# Patient Record
Sex: MALE
[De-identification: names, ages, dates, MRNs, and addresses within clinical notes are randomized per-mention and may not be internally consistent; named-entity substitution may affect disease eponyms.]

---

## 2024-05-06 ENCOUNTER — APPOINTMENT (OUTPATIENT)
Dept: UROLOGY | Facility: CLINIC | Age: 59
End: 2024-05-06

## 2024-06-05 PROBLEM — Z00.00 ENCOUNTER FOR PREVENTIVE HEALTH EXAMINATION: Status: ACTIVE | Noted: 2024-06-05

## 2024-07-15 ENCOUNTER — APPOINTMENT (OUTPATIENT)
Dept: UROLOGY | Facility: CLINIC | Age: 59
End: 2024-07-15
Payer: COMMERCIAL

## 2024-07-15 VITALS
DIASTOLIC BLOOD PRESSURE: 78 MMHG | HEART RATE: 94 BPM | SYSTOLIC BLOOD PRESSURE: 126 MMHG | TEMPERATURE: 98 F | OXYGEN SATURATION: 94 % | HEIGHT: 72 IN | BODY MASS INDEX: 26.41 KG/M2 | WEIGHT: 195 LBS

## 2024-07-15 DIAGNOSIS — R35.0 FREQUENCY OF MICTURITION: ICD-10-CM

## 2024-07-15 DIAGNOSIS — Z82.49 FAMILY HISTORY OF ISCHEMIC HEART DISEASE AND OTHER DISEASES OF THE CIRCULATORY SYSTEM: ICD-10-CM

## 2024-07-15 DIAGNOSIS — B20 HUMAN IMMUNODEFICIENCY VIRUS [HIV] DISEASE: ICD-10-CM

## 2024-07-15 DIAGNOSIS — Z86.79 PERSONAL HISTORY OF OTHER DISEASES OF THE CIRCULATORY SYSTEM: ICD-10-CM

## 2024-07-15 DIAGNOSIS — Z78.9 OTHER SPECIFIED HEALTH STATUS: ICD-10-CM

## 2024-07-15 DIAGNOSIS — R39.12 POOR URINARY STREAM: ICD-10-CM

## 2024-07-15 PROCEDURE — 99205 OFFICE O/P NEW HI 60 MIN: CPT

## 2024-07-15 PROCEDURE — 76857 US EXAM PELVIC LIMITED: CPT

## 2024-07-16 LAB
PSA FREE FLD-MCNC: 13 %
PSA FREE SERPL-MCNC: 0.13 NG/ML
PSA SERPL-MCNC: 0.97 NG/ML

## 2024-07-17 PROBLEM — Z82.49 FAMILY HISTORY OF HYPERTENSION: Status: ACTIVE | Noted: 2024-07-17

## 2024-07-17 PROBLEM — B20 HIV DISEASE: Status: RESOLVED | Noted: 2024-07-17 | Resolved: 2024-07-17

## 2024-07-17 PROBLEM — Z78.9 SOCIAL ALCOHOL USE: Status: ACTIVE | Noted: 2024-07-17

## 2024-07-17 PROBLEM — Z78.9 NON-SMOKER: Status: ACTIVE | Noted: 2024-07-17

## 2024-07-17 PROBLEM — Z86.79 HISTORY OF HYPERTENSION: Status: RESOLVED | Noted: 2024-07-17 | Resolved: 2024-07-17

## 2024-08-15 ENCOUNTER — APPOINTMENT (OUTPATIENT)
Dept: UROLOGY | Facility: CLINIC | Age: 59
End: 2024-08-15
Payer: COMMERCIAL

## 2024-08-15 PROCEDURE — 99213 OFFICE O/P EST LOW 20 MIN: CPT

## 2024-08-15 NOTE — HISTORY OF PRESENT ILLNESS
[FreeTextEntry1] :  Language: English Date of First visit: 2024 Accompanied by: *** Contact info: *** Referring Provider/PCP: Guy Mtz  [phone]      CC/ Problem List:   =============================================================================== FIRST VISIT: The patient has been a private practice patient of Dr. Wilmar Marcano Prior paper chart WAS available for this visit. Any Prior paper chart is scanned into the [employer] system. Please see admin   The patient's first visit with [facility] urology was on 2024 when the patient was 59 years old. He was initially seen in  by Dr Wilmar Marcano for urinary retention and ED.  This happened randomly. A Michael was inserted, and he was started on Flomax BID and Ciprofloxacin. He had another episode of retention in  and was subsequently started in Finasteride. He had this after a surgery. He was given antifungal cream in 2017 for balanitis which helped resolve his "rash". 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 His most recent PSA on 3/17/23 was 1.94 (corrected for 5ARI).     ------------------------------------------------------------------------------------------- INTERVAL VISITS:    He was first seen in 2024.  He was no longer on Zoloft but was on Sildenafil, Tamsulosin and finasteride. He did still report some AM hesitancy and sometimes has weak stream. He sometimes does not feel like his bladder empties fully. He denies straining to void.  He does occasionally have slight dysuria, no hematuria. He denies constipation. He has a hard time ejaculating.  The patient's age today 08/15/2024  is 59 year old. Please note interval events and changes in PMH, PSH, MEDS and ALLERGIES were reviewed.  This visit was held via telehealth using a HIPAA compliant two way Audiovisual platform The patient was located: [facility] The provider was in the office. Participants: Patient JEN CENTENO , Olamide Hinton MD No other participants were present  At his first visit we talked about surgical options because he was on maximal medical therapy and is only 58yo. His Prostate is 109gm. He does not want to have anything done now.     ===============================================================================   PMH: HIV, HTN, glaucoma suspect (high pressure) PSH: cyst POBH: (if applicable) FH: HTN and DM   ALL: NKDA MEDS: Triumeq, Tamsulosin 0.8mg po QHS, Finasteride, Sildenafil 50mg, HTN med x2, Vit D, eye drops SOC: Never smoked, Social ETOH, no drugs     ROS: Review of Systems is as per HPI unless otherwise denoted below     =============================================================================== DATA:   LABS:------------------------------------------------------------------------------------------------------------------- 2015: PSA 1.3 (not on 5ARI) 3/3/2017: PSA 2.1 (not on 5ARI) 2017: 3.0 (corrected for 5ARI) 3/1/2021: PSA 1.6 (corrected for 5ARI) 3/17/2023: PSA 1.94 (corrected for 5ARI) 7/15/2024: PSA 0.97 (EMRE correction 1.94)    RADS:-------------------------------------------------------------------------------------------------------------------  7/15/2024: Prostate Sono in office 109gm prostate with calcifications, 16cc PVR + median lobe; bladder not super full so hard to determine full median lobe size      PATHOLOGY/CYTOLOGY:-------------------------------------------------------------------------------------------       VOIDING STUDIES: ----------------------------------------------------------------------------------------------------  7/15/2024: PVR 18cc     STONE STUDIES: (Analysis/LLSA)----------------------------------------------------------------------------------       PROCEDURES: -----------------------------------------------------------------------------------------------         ===============================================================================    PHYSICAL EXAM:  GEN: AAOx3, NAD; Habitus: normal  BARRIERS to CARE: none  PSYCH: Appropriate Behavior, Affect Congruent  HEENT: AT/NC Trachea midline. EOMI.  Lungs: No labored breathing.  NEURO: + Movement, all 4 extremities grossly intact without deficits. No tremors.  SKIN: Warm dry. No visible rashes or ulcers  GAIT: Gait normal, Stability good  =======================================================================================      ASSESSMENT and PLAN   Today 08/15/2024 the patient is a 59 year old male with a history of the followin. BPH with h/o urinary retention x2 on max medical therapy He is nervous about retention and I am as well given that he is only 59, has a 100+ gram prostate, has been in retention twice and is on maximal medical therapy. Given his prostate size I discussed his two primary options (nazario given his age) for durability and results and that would be HOLEP and PAE. I went through each procedure and risks/benefits and compared/contrasted the two. He knows HOLEP has higher risk of RTG ejaculation but does remove the median lobe and likely has more durable results. It can cause some temporary incontinence. He knows the PAE will only shrink the median lobe.  He knows PAE likely preserves ejaculation. The results are more gradual onset and may not have the same durability as HOLEP. Additionally he may need treatment of his median lobe if his outcomes are not enough.  He has decided PAE is what he wants to consider. He understands the risks and downsides of PAE and that the median lobe is not fully treated with PAE (it is shrunk) and the results may not be as durable. We will set him up to see Dr. Dumont.    2. prostate cancer screening His PSA is normal  -----------------------------------------------------------------------------------------------------   LABS/TESTS Ordered:   Meds Ordered:   Follow up: Dr Dumont for PAE discussion   -----------------------------------------------------------------------------------------------------   The total amount of time I have personally spent preparing for this visit, reviewing the patient's test results, obtaining external history, ordering tests/medications, documenting clinical information, communicating with and counseling the patient/family and/or caregiver(s), and spent face to face with the patient explaining the above was 25 minutes.   Thank you for allowing me to participate in your patient's care. Please feel free to contact me with any questions.   Olamide Hinton MD [facility] Department of Urology   [address] [place] [id number] P: [phone]; F: [phone]

## 2024-08-28 ENCOUNTER — APPOINTMENT (OUTPATIENT)
Dept: UROLOGY | Facility: CLINIC | Age: 59
End: 2024-08-28
Payer: COMMERCIAL

## 2024-08-28 DIAGNOSIS — N40.0 BENIGN PROSTATIC HYPERPLASIA WITHOUT LOWER URINARY TRACT SYMPMS: ICD-10-CM

## 2024-08-28 PROCEDURE — 99203 OFFICE O/P NEW LOW 30 MIN: CPT

## 2024-08-28 PROCEDURE — 99213 OFFICE O/P EST LOW 20 MIN: CPT

## 2024-08-28 RX ORDER — FINASTERIDE 5 MG/1
5 TABLET, FILM COATED ORAL
Refills: 0 | Status: ACTIVE | COMMUNITY

## 2024-08-28 RX ORDER — SILDENAFIL 50 MG/1
50 TABLET ORAL
Refills: 0 | Status: ACTIVE | COMMUNITY

## 2024-08-28 RX ORDER — ABACAVIR SULFATE, DOLUTEGRAVIR SODIUM, LAMIVUDINE 600; 50; 300 MG/1; MG/1; MG/1
TABLET, FILM COATED ORAL
Refills: 0 | Status: ACTIVE | COMMUNITY

## 2024-08-28 RX ORDER — TAMSULOSIN HYDROCHLORIDE 0.4 MG/1
0.4 CAPSULE ORAL
Refills: 0 | Status: ACTIVE | COMMUNITY

## 2024-08-28 NOTE — HISTORY OF PRESENT ILLNESS
[Home] : at home, [unfilled] , at the time of the visit. [Medical Office: ([facility])___] : at the medical office located in  [Verbal consent obtained from patient] : the patient, [unfilled] [FreeTextEntry1] :  Dear Dr. Hinton,   Thank you so much for the referral to help care for your patient.  Chief Complaint: PAE evaluation  Date of first visit: 8/28/24  Shamir Fulton is a 59 year old man with PMH of HTN, HIV, glaucoma, ED, BPH who presents for PAE Evaluation.  He was initially seen in 2015 by Dr Wilmar Marcano for urinary retention and ED. This happened randomly. A Michael was inserted, and he was started on Flomax. He had another episode of retention in 2017 and was subsequently started in Finasteride. He had this after a surgery. Patient is prostate MRI and biopsy naive. Denies family hx of  cancers.  He was prescribed Zoloft 50mg for premature ejaculation in past but no longer taking it, and Sildenafil for ED.   PSA Hx:  11/28/2015: PSA 1.3 (not on 5ARI) 3/3/2017: PSA 2.1 (not on 5ARI) 11/22/2017: 3.0 (corrected for 5ARI) 3/1/2021: PSA 1.6 (corrected for 5ARI) 3/17/2023: PSA 1.94 (corrected for 5ARI) 7/15/2024: PSA 0.97 (EMRE correction 1.94)  7/15/2024: Prostate US in office 109gm prostate with calcifications, 16cc PVR + median lobe; bladder not super full so hard to determine full median lobe size  Social Hx:  nonsmoker social ETOH no illicit drug use   FHx:  mother with colon cancer   Allergies:  NKDA   8/28/24 IPSS 7 QOL  5 MADELINE  14  The patient denies fevers, chills, nausea and or vomiting and no unexplained weight loss.  All pertinent laboratory, films and physician notes were reviewed. Questionnaire results were discussed with patient.

## 2024-08-28 NOTE — ASSESSMENT
[FreeTextEntry1] :  59 year old man with PMH of HTN, HIV, glaucoma, ED, BPH who presents for PAE Evaluation.  Prostate MRI ordered- he will call [facility] to schedule this MRI review with Dr. Dumont  1. MRI prostate - assess anatomy/size/shape of gland 2. Book for PAE- approach (TR vs TF) to be determined at follow up visit. Does not use assistive device/rollator walker and no hx of CVA  3. Medical clearance, labs.  4. Continue 0.4mg Flomax and finasteride, will stop finasteride 1 month prior to PAE   Thank you very much for allowing me to assist in the care of this patient. Please do not hesitate to contact me with any additional questions or concerns.

## 2024-09-11 ENCOUNTER — RESULT REVIEW (OUTPATIENT)
Age: 59
End: 2024-09-11

## 2024-09-11 ENCOUNTER — APPOINTMENT (OUTPATIENT)
Dept: MRI IMAGING | Facility: CLINIC | Age: 59
End: 2024-09-11
Payer: COMMERCIAL

## 2024-09-11 ENCOUNTER — OUTPATIENT (OUTPATIENT)
Dept: OUTPATIENT SERVICES | Facility: HOSPITAL | Age: 59
LOS: 1 days | End: 2024-09-11

## 2024-09-11 PROCEDURE — 76498P: CUSTOM | Mod: 26

## 2024-09-11 PROCEDURE — 72197 MRI PELVIS W/O & W/DYE: CPT | Mod: 26

## 2024-09-23 ENCOUNTER — APPOINTMENT (OUTPATIENT)
Dept: UROLOGY | Facility: CLINIC | Age: 59
End: 2024-09-23
Payer: COMMERCIAL

## 2024-09-23 VITALS
HEART RATE: 75 BPM | DIASTOLIC BLOOD PRESSURE: 80 MMHG | TEMPERATURE: 97.7 F | WEIGHT: 195 LBS | BODY MASS INDEX: 26.41 KG/M2 | OXYGEN SATURATION: 97 % | HEIGHT: 72 IN | SYSTOLIC BLOOD PRESSURE: 127 MMHG

## 2024-09-23 DIAGNOSIS — N40.0 BENIGN PROSTATIC HYPERPLASIA WITHOUT LOWER URINARY TRACT SYMPMS: ICD-10-CM

## 2024-09-23 DIAGNOSIS — R35.0 FREQUENCY OF MICTURITION: ICD-10-CM

## 2024-09-23 PROCEDURE — 99204 OFFICE O/P NEW MOD 45 MIN: CPT

## 2024-09-23 NOTE — PHYSICAL EXAM
[General Appearance - In No Acute Distress] : no acute distress [] : no respiratory distress [Normal Station and Gait] : the gait and station were normal for the patient's age [No Focal Deficits] : no focal deficits [Oriented To Time, Place, And Person] : oriented to person, place, and time [Prostate Tenderness] : the prostate was not tender [No Prostate Nodules] : no prostate nodules [Prostate Size ___ gm] : prostate size [unfilled] gm

## 2024-09-24 NOTE — HISTORY OF PRESENT ILLNESS
[FreeTextEntry1] : Dear Dr. Hinton,  Thank you so much for the referral to help care for your patient.  Chief Complaint: PAE evaluation Date of first visit: 8/28/24  Shamir Fulton is a 59 year old man with PMH of HTN, HIV, glaucoma, ED, BPH who presents for PAE Evaluation.  He was initially seen in 2015 by Dr Wilmar Marcano for urinary retention and ED. This happened randomly. A Mccloud was inserted, and he was started on Flomax. He had another episode of retention in 2017 and was subsequently started in Finasteride. He had this after a wrist surgery. Patient is prostate MRI and biopsy naive. Denies family hx of  cancers. He was prescribed Zoloft 50mg for premature ejaculation in past but no longer taking it, and Sildenafil for ED.  PSA Hx: 07/15/2024   0.97, 1.94 corrected for finasteride , psad 0.02, ng/ml/cc  11/28/2015: PSA 1.3 (not on 5ARI) 3/3/2017: PSA 2.1 (not on 5ARI) 11/22/2017: 3.0 (corrected for 5ARI) 3/1/2021: PSA 1.6 (corrected for 5ARI) 3/17/2023: PSA 1.94 (corrected for 5ARI) 7/15/2024: PSA 0.97 (EMRE correction 1.94)  7/15/2024: Prostate US in office 109gm prostate with calcifications, 16cc PVR + median lobe; bladder not super full so hard to determine full median lobe size  MR at [facility] on 09/11/2024.  88 cc prostate with PIRADS 3 lesion #1 measuring 5.5 x 4.7 mm in the Right, anterior apex TZ.  No LAD No EPE, No Bony Lesions.  The images have been reviewed and clinical implications discussed with the patient. On review, lesion is low risk.   Social Hx: nonsmoker social ETOH no illicit drug use  FHx: mother with colon cancer  Allergies: NKDA  09/23/2024 IPSS      QOL MADELINE  8/28/24 IPSS 7 QOL 5 MADELINE 14  The patient denies fevers, chills, nausea and or vomiting and no unexplained weight loss.  All pertinent laboratory, films and physician notes were reviewed. Questionnaire results were discussed with patient.  I discussed with the patient and reviewed the risks and benefits and alternatives to prostate artery embolization. Time was spent with the patient discussing alternative therapies including TURP, urolift, medical therapy, laser, HoLEP, SPP and embolization.   We discussed the early result and success of the procedure in general as well as my personal results. Specifically, reviewed risk related to nontarget embolization most commonly involving the bladder and/or rectum. We also spoke about the potential for post procedure obstruction required mccloud catheter drainage. We reviewed some technical aspects up of the procedure including embolic material utilizing the importance of cone beam CT prior to embolization. The patient wishes procedure scheduling a procedure.   Specific risks of the embolization procedure which were discussed with the patient including infection, bleeding, dysuria, hematuria, hematospermia, non-target embolization which would result in damage to bladder wall leading to ulceration/ischemia, rectal wall injury, and injury to penis and ejaculatory mechanism. These risks are considered to be low. Pelvic pain and burning is not uncommon and considered to be mild and transient. The patient understands that a Mccloud catheter may be inserted during the procedure to help guide embolization and will be removed at the end of the procedure. A small percentage of patients will have some degree of urinary retention after embolization and will require a catheter to be left in. He understands this. We also discussed that long-term results of prostate embolization are unknown but response rates in reduction of his IPSS score are 80-85% based on current literature and state of the art techniques.   It was explained to the patient that approximate 5% patient experienced some degree bruising following femoral or radial artery catheterization. The hematoma is benign and resolves spontaneously under typical circumstances. The specific benefits and risks of transradial (TR) access versus transfemoral (TF) access were discussed in detail with the patient. This includes decreased risk of bleeding, immediate ambulation and faster discharge time. Potential increased and extremely remote risk of cerebral emboli was discussed. The patient was given information packet with further details. The patient does indicate preference for transradial access during the procedure. Discussed off label use of FDA liquid embolic.

## 2024-09-24 NOTE — ASSESSMENT
[FreeTextEntry1] : 59-year-old man with PMH of HTN, HIV, glaucoma, ED, BPH who presents for PAE Evaluation.  MRI reviewed - lesion in MRI is low risk  left radial artery 2.7mm Bud BRADLEY   1. Book for PAE- TR approach. Does not use assistive device/rollator walker and no hx of CVA 3. Medical clearance, labs- patient wants PAE early 2025, will do clearance and labs closer to date of procedure 4. Continue 0.4mg Flomax and finasteride, will stop finasteride 1 month prior to PAE  Thank you very much for allowing me to assist in the care of this patient. Please do not hesitate to contact me with any additional questions or concerns.     Sincerely,     Kamlesh Dumont D.O. Professor of Urology and Radiology  of Urology at [facility] Director for Prostate Cancer [address] [place] [address], [address] [place] [address] Phone: [phone]  I was present with the Resident during the key portions of the history and exam. I discussed the case with the Resident and agree with the findings and plan as documented in the Resident/Fellow 's note, unless noted below.

## 2024-09-24 NOTE — HISTORY OF PRESENT ILLNESS
[FreeTextEntry1] : Dear Dr. Hinton,  Thank you so much for the referral to help care for your patient.  Chief Complaint: PAE evaluation Date of first visit: 8/28/24  Shamir Fulton is a 59 year old man with PMH of HTN, HIV, glaucoma, ED, BPH who presents for PAE Evaluation.  He was initially seen in 2015 by Dr Wilmar Marcano for urinary retention and ED. This happened randomly. A Mccloud was inserted, and he was started on Flomax. He had another episode of retention in 2017 and was subsequently started in Finasteride. He had this after a wrist surgery. Patient is prostate MRI and biopsy naive. Denies family hx of  cancers. He was prescribed Zoloft 50mg for premature ejaculation in past but no longer taking it, and Sildenafil for ED.  PSA Hx: 07/15/2024   0.97, 1.94 corrected for finasteride , psad 0.02, ng/ml/cc  11/28/2015: PSA 1.3 (not on 5ARI) 3/3/2017: PSA 2.1 (not on 5ARI) 11/22/2017: 3.0 (corrected for 5ARI) 3/1/2021: PSA 1.6 (corrected for 5ARI) 3/17/2023: PSA 1.94 (corrected for 5ARI) 7/15/2024: PSA 0.97 (EMRE correction 1.94)  7/15/2024: Prostate US in office 109gm prostate with calcifications, 16cc PVR + median lobe; bladder not super full so hard to determine full median lobe size  MR at [facility] on 09/11/2024.  88 cc prostate with PIRADS 3 lesion #1 measuring 5.5 x 4.7 mm in the Right, anterior apex TZ.  No LAD No EPE, No Bony Lesions.  The images have been reviewed and clinical implications discussed with the patient. On review, lesion is low risk.   Social Hx: nonsmoker social ETOH no illicit drug use  FHx: mother with colon cancer  Allergies: NKDA  09/23/2024 IPSS      QOL MADELINE  8/28/24 IPSS 7 QOL 5 MADELINE 14  The patient denies fevers, chills, nausea and or vomiting and no unexplained weight loss.  All pertinent laboratory, films and physician notes were reviewed. Questionnaire results were discussed with patient.  I discussed with the patient and reviewed the risks and benefits and alternatives to prostate artery embolization. Time was spent with the patient discussing alternative therapies including TURP, urolift, medical therapy, laser, HoLEP, SPP and embolization.   We discussed the early result and success of the procedure in general as well as my personal results. Specifically, reviewed risk related to nontarget embolization most commonly involving the bladder and/or rectum. We also spoke about the potential for post procedure obstruction required mccloud catheter drainage. We reviewed some technical aspects up of the procedure including embolic material utilizing the importance of cone beam CT prior to embolization. The patient wishes procedure scheduling a procedure.   Specific risks of the embolization procedure which were discussed with the patient including infection, bleeding, dysuria, hematuria, hematospermia, non-target embolization which would result in damage to bladder wall leading to ulceration/ischemia, rectal wall injury, and injury to penis and ejaculatory mechanism. These risks are considered to be low. Pelvic pain and burning is not uncommon and considered to be mild and transient. The patient understands that a Mccloud catheter may be inserted during the procedure to help guide embolization and will be removed at the end of the procedure. A small percentage of patients will have some degree of urinary retention after embolization and will require a catheter to be left in. He understands this. We also discussed that long-term results of prostate embolization are unknown but response rates in reduction of his IPSS score are 80-85% based on current literature and state of the art techniques.   It was explained to the patient that approximate 5% patient experienced some degree bruising following femoral or radial artery catheterization. The hematoma is benign and resolves spontaneously under typical circumstances. The specific benefits and risks of transradial (TR) access versus transfemoral (TF) access were discussed in detail with the patient. This includes decreased risk of bleeding, immediate ambulation and faster discharge time. Potential increased and extremely remote risk of cerebral emboli was discussed. The patient was given information packet with further details. The patient does indicate preference for transradial access during the procedure. Discussed off label use of FDA liquid embolic.

## 2024-09-24 NOTE — HISTORY OF PRESENT ILLNESS
[FreeTextEntry1] : Dear Dr. Hinton,  Thank you so much for the referral to help care for your patient.  Chief Complaint: PAE evaluation Date of first visit: 8/28/24  Shamir Fulton is a 59 year old man with PMH of HTN, HIV, glaucoma, ED, BPH who presents for PAE Evaluation.  He was initially seen in 2015 by Dr Wilmar Marcano for urinary retention and ED. This happened randomly. A Mccloud was inserted, and he was started on Flomax. He had another episode of retention in 2017 and was subsequently started in Finasteride. He had this after a wrist surgery. Patient is prostate MRI and biopsy naive. Denies family hx of  cancers. He was prescribed Zoloft 50mg for premature ejaculation in past but no longer taking it, and Sildenafil for ED.  PSA Hx: 07/15/2024   0.97, 1.94 corrected for finasteride , psad 0.02, ng/ml/cc  11/28/2015: PSA 1.3 (not on 5ARI) 3/3/2017: PSA 2.1 (not on 5ARI) 11/22/2017: 3.0 (corrected for 5ARI) 3/1/2021: PSA 1.6 (corrected for 5ARI) 3/17/2023: PSA 1.94 (corrected for 5ARI) 7/15/2024: PSA 0.97 (EMRE correction 1.94)  7/15/2024: Prostate US in office 109gm prostate with calcifications, 16cc PVR + median lobe; bladder not super full so hard to determine full median lobe size  MR at [facility] on 09/11/2024.  88 cc prostate with PIRADS 3 lesion #1 measuring 5.5 x 4.7 mm in the Right, anterior apex TZ.  No LAD No EPE, No Bony Lesions.  The images have been reviewed and clinical implications discussed with the patient. On review, lesion is low risk.   Social Hx: nonsmoker social ETOH no illicit drug use  FHx: mother with colon cancer  Allergies: NKDA  09/23/2024 IPSS      QOL MADELINE  8/28/24 IPSS 7 QOL 5 MADELINE 14  The patient denies fevers, chills, nausea and or vomiting and no unexplained weight loss.  All pertinent laboratory, films and physician notes were reviewed. Questionnaire results were discussed with patient.  I discussed with the patient and reviewed the risks and benefits and alternatives to prostate artery embolization. Time was spent with the patient discussing alternative therapies including TURP, urolift, medical therapy, laser, HoLEP, SPP and embolization.   We discussed the early result and success of the procedure in general as well as my personal results. Specifically, reviewed risk related to nontarget embolization most commonly involving the bladder and/or rectum. We also spoke about the potential for post procedure obstruction required mccloud catheter drainage. We reviewed some technical aspects up of the procedure including embolic material utilizing the importance of cone beam CT prior to embolization. The patient wishes procedure scheduling a procedure.   Specific risks of the embolization procedure which were discussed with the patient including infection, bleeding, dysuria, hematuria, hematospermia, non-target embolization which would result in damage to bladder wall leading to ulceration/ischemia, rectal wall injury, and injury to penis and ejaculatory mechanism. These risks are considered to be low. Pelvic pain and burning is not uncommon and considered to be mild and transient. The patient understands that a Mccloud catheter may be inserted during the procedure to help guide embolization and will be removed at the end of the procedure. A small percentage of patients will have some degree of urinary retention after embolization and will require a catheter to be left in. He understands this. We also discussed that long-term results of prostate embolization are unknown but response rates in reduction of his IPSS score are 80-85% based on current literature and state of the art techniques.   It was explained to the patient that approximate 5% patient experienced some degree bruising following femoral or radial artery catheterization. The hematoma is benign and resolves spontaneously under typical circumstances. The specific benefits and risks of transradial (TR) access versus transfemoral (TF) access were discussed in detail with the patient. This includes decreased risk of bleeding, immediate ambulation and faster discharge time. Potential increased and extremely remote risk of cerebral emboli was discussed. The patient was given information packet with further details. The patient does indicate preference for transradial access during the procedure. Discussed off label use of FDA liquid embolic.

## 2024-09-24 NOTE — ASSESSMENT
[FreeTextEntry1] : 59-year-old man with PMH of HTN, HIV, glaucoma, ED, BPH who presents for PAE Evaluation.  MRI reviewed - lesion in MRI is low risk  left radial artery 2.7mm Bud BRADLEY   1. Book for PAE- TR approach. Does not use assistive device/rollator walker and no hx of CVA 3. Medical clearance, labs- patient wants PAE early 2025, will do clearance and labs closer to date of procedure 4. Continue 0.4mg Flomax and finasteride, will stop finasteride 1 month prior to PAE  Thank you very much for allowing me to assist in the care of this patient. Please do not hesitate to contact me with any additional questions or concerns.     Sincerely,     Kamlesh Dumont D.O. Professor of Urology and Radiology  of Urology at [facility] Director for Prostate Cancer [address] [place] [address], [address] [place] [address] Phone: [phone]  I was present with the Resident during the key portions of the history and exam. I discussed the case with the Resident and agree with the findings and plan as documented in the Resident/Fellow 's note, unless noted below.

## 2024-09-24 NOTE — ASSESSMENT
[FreeTextEntry1] : 59-year-old man with PMH of HTN, HIV, glaucoma, ED, BPH who presents for PAE Evaluation.  MRI reviewed - lesion in MRI is low risk  left radial artery 2.7mm Bud BRADLEY   1. Book for PAE- TR approach. Does not use assistive device/rollator walker and no hx of CVA 3. Medical clearance, labs- patient wants PAE early 2025, will do clearance and labs closer to date of procedure 4. Continue 0.4mg Flomax and finasteride, will stop finasteride 1 month prior to PAE  Thank you very much for allowing me to assist in the care of this patient. Please do not hesitate to contact me with any additional questions or concerns.     Sincerely,     Kamlesh Dumont D.O. Professor of Urology and Radiology  of Urology at [facility] Director for Prostate Cancer [address] [place] [address], [address] [place] [address] Phone: [phone]  I was present with the Resident during the key portions of the history and exam. I discussed the case with the Resident and agree with the findings and plan as documented in the Resident/Fellow 's note, unless noted below.

## 2024-09-24 NOTE — ADDENDUM
[FreeTextEntry1] :       I, Dr. Dumont, personally performed the evaluation and management (E/M) services for this new patient.  That E/M includes conducting the initial examination, assessing all conditions, and establishing the plan of care.  Today, my ACP, Adeola Estevez, ANP-BC, was here to observe my evaluation and management services for this patient to be followed going forward.

## 2024-11-12 ENCOUNTER — NON-APPOINTMENT (OUTPATIENT)
Age: 59
End: 2024-11-12